# Patient Record
Sex: FEMALE | Race: WHITE | NOT HISPANIC OR LATINO | URBAN - METROPOLITAN AREA
[De-identification: names, ages, dates, MRNs, and addresses within clinical notes are randomized per-mention and may not be internally consistent; named-entity substitution may affect disease eponyms.]

---

## 2017-02-13 ENCOUNTER — TELEPHONE (OUTPATIENT)
Dept: NEUROLOGY | Facility: CLINIC | Age: 82
End: 2017-02-13

## 2017-02-13 NOTE — TELEPHONE ENCOUNTER
----- Message from Mai Morton sent at 2/13/2017 11:18 AM CST -----  Contact: Kristen-Dr. My Morales  NP--Requesting an appt on today for patient. States she is having weakness on the right side and tingling on the left side. Kristen was advised of availability. Please adv/call [phone].//thanks. cw